# Patient Record
Sex: FEMALE | Race: OTHER | NOT HISPANIC OR LATINO | Employment: FULL TIME | URBAN - METROPOLITAN AREA
[De-identification: names, ages, dates, MRNs, and addresses within clinical notes are randomized per-mention and may not be internally consistent; named-entity substitution may affect disease eponyms.]

---

## 2023-02-27 LAB
ALANINE AMINOTRANSFERASE (SGPT) (U/L) IN SER/PLAS: 29 U/L (ref 7–45)
ALBUMIN (G/DL) IN SER/PLAS: 4.5 G/DL (ref 3.4–5)
ALKALINE PHOSPHATASE (U/L) IN SER/PLAS: 74 U/L (ref 33–136)
ANION GAP IN SER/PLAS: 15 MMOL/L (ref 10–20)
ASPARTATE AMINOTRANSFERASE (SGOT) (U/L) IN SER/PLAS: 12 U/L (ref 9–39)
BILIRUBIN TOTAL (MG/DL) IN SER/PLAS: 0.6 MG/DL (ref 0–1.2)
CALCIUM (MG/DL) IN SER/PLAS: 9.9 MG/DL (ref 8.6–10.6)
CARBON DIOXIDE, TOTAL (MMOL/L) IN SER/PLAS: 26 MMOL/L (ref 21–32)
CHLORIDE (MMOL/L) IN SER/PLAS: 103 MMOL/L (ref 98–107)
CHOLESTEROL IN LDL (MG/DL) IN SER/PLAS BY DIRECT ASSAY: 73 MG/DL (ref 0–129)
CREATININE (MG/DL) IN SER/PLAS: 1.42 MG/DL (ref 0.5–1.05)
ESTIMATED AVERAGE GLUCOSE FOR HBA1C: 180 MG/DL
GFR FEMALE: 41 ML/MIN/1.73M2
GLUCOSE (MG/DL) IN SER/PLAS: 224 MG/DL (ref 74–99)
HEMOGLOBIN A1C/HEMOGLOBIN TOTAL IN BLOOD: 7.9 %
POTASSIUM (MMOL/L) IN SER/PLAS: 4.7 MMOL/L (ref 3.5–5.3)
PROTEIN TOTAL: 7.4 G/DL (ref 6.4–8.2)
SODIUM (MMOL/L) IN SER/PLAS: 139 MMOL/L (ref 136–145)
UREA NITROGEN (MG/DL) IN SER/PLAS: 33 MG/DL (ref 6–23)

## 2023-03-07 ENCOUNTER — DOCUMENTATION (OUTPATIENT)
Dept: CARE COORDINATION | Facility: CLINIC | Age: 63
End: 2023-03-07
Payer: COMMERCIAL

## 2023-03-20 ENCOUNTER — DOCUMENTATION (OUTPATIENT)
Dept: PRIMARY CARE | Facility: CLINIC | Age: 63
End: 2023-03-20
Payer: COMMERCIAL

## 2023-03-20 ENCOUNTER — TELEPHONE (OUTPATIENT)
Dept: PRIMARY CARE | Facility: CLINIC | Age: 63
End: 2023-03-20
Payer: COMMERCIAL

## 2023-03-20 ENCOUNTER — PATIENT OUTREACH (OUTPATIENT)
Dept: CARE COORDINATION | Facility: CLINIC | Age: 63
End: 2023-03-20
Payer: COMMERCIAL

## 2023-03-20 DIAGNOSIS — E11.69 DIABETES MELLITUS TYPE 2 IN OBESE: Primary | ICD-10-CM

## 2023-03-20 DIAGNOSIS — E66.9 DIABETES MELLITUS TYPE 2 IN OBESE: Primary | ICD-10-CM

## 2023-03-20 RX ORDER — SEMAGLUTIDE 2.68 MG/ML
2 INJECTION, SOLUTION SUBCUTANEOUS
Qty: 4 ML | Refills: 5 | Status: SHIPPED | OUTPATIENT
Start: 2023-03-20 | End: 2023-05-18 | Stop reason: ALTCHOICE

## 2023-03-20 NOTE — PROGRESS NOTES
Milestones Visit 4 held via Zoom: Katie has been working hard and has made multiple lifestyle changes for the management of diabetes. Her sensor fell off, but last data from March 7 showed TIR 77% . Went over ways to ensure sensor stays on and sent brochure to email and also encouraged to call for potential free replacement. She is having an issue with getting her Ozempic 2.0 and this was discussed as a team today as the order is in chart for renewal and it is not out of stock per patient. Milestones NP will call the pharmacy today to resolve issue. We let her know she can take missed dose (past Sat.) up to 5 days after or skip it and take next scheduled dose. She has made changes to her breakfast and is having 2 cheese sticks with berries and stopped weekend coffee she was having. She feels better and we will review data once she puts on the new sensor. Today's visit focused on PA and she has increased this by walking 15 minutes a day. Her goal is to increase walk time by 15 min a day and do yoga Mondays and Fridays after work.  She had questions about mood swings if related to diabetes medications.  We inquired about diabetes distress which this does not seem to be related to. She will monitor and discuss with primary care. We will see her next month for visit 5.

## 2023-03-20 NOTE — PROGRESS NOTES
Walt Chery is a 62 y.o. female who presents for  visit 4 of the Diabetes Milestones program today, conducted visit with Lesley Son RD/DANIELA.  Known complications due to diabetes included none    Current symptoms/problems include none. Her clinical course has improved.     Current diabetes regimen is as follows:  Ozempic  Glipizide Jardiance    The patient is currently checking the blood glucose  throughout the day.    Patient is using: continuous glucose monitor and AGP is noted for this visit noting the following values over last 14 days  TIR 77 high 21 very high 2    Hypoglycemia frequency: none  Hypoglycemia awareness: No     Exercise:  she has no routine exercise regimen, she walks to bus and to workplace from bus stop. Enjoys walking, in discussing creating exercise routine she notes that she could walk 15 minutes after lunch in addition to current routine, she also notes that she can and will begin to incorporate yoga on Mondays and Fridays    Meal panning: She is using ADA exchanges and carbohydrate counting.    Review of Systems    Objective   There were no vitals taken for this visit.  Physical Exam    Lab Review  Lab Results   Component Value Date    TRIG 189 (H) 11/04/2022    CHOL 271 (H) 11/04/2022    HDL 50.8 11/04/2022     Lab Results   Component Value Date    HGBA1C 7.9 (A) 02/27/2023    HGBA1C 10.8 (A) 11/04/2022    HGBA1C 7.3 (A) 02/22/2022     Lab Results   Component Value Date    CREATININE 1.42 (H) 02/27/2023       Health Maintenance:   BP Readings from Last 3 Encounters:   12/29/22 116/80   11/16/22 (!) 132/91   10/27/22 (!) 167/102      There is no height or weight on file to calculate BMI.     No current outpatient medications on file.  Assessment/Plan   There are no diagnoses linked to this encounter.    Type 2 diabetes mellitus, is not at goal. Patient's last hemoglobin a1c shows improvement since 7/2022 value.  Lab Results   Component Value Date    HGBA1C 7.9 (A)  02/27/2023     Patient notes she went to [facility] obtain new dose of Ozempic prescribed by her endocrinologist Dr. Strauss and advised it was not there and there was no order    RX changes:  no prescription changes, I will follow up and call about the Ozempic prescription to resolve the issue of her obtaining the 2.0 mg weekly dose    Education:  exercise and discussed how to develop sustainable exercise plan and benefits of physical activity with diabetes. Goals were identified and set as noted in note above  Follow up: I recommend diabetes care be  3 weeks, Milestones visit 5 .

## 2023-03-21 ENCOUNTER — TELEPHONE (OUTPATIENT)
Dept: CARE COORDINATION | Facility: CLINIC | Age: 63
End: 2023-03-21
Payer: COMMERCIAL

## 2023-03-21 NOTE — PROGRESS NOTES
Specialty pharmacy called to reverse order for patient to  Ozmepic medication at [facility], order for Ozempic has already been entered

## 2023-03-31 DIAGNOSIS — E11.9 TYPE 2 DIABETES MELLITUS WITHOUT COMPLICATION, WITHOUT LONG-TERM CURRENT USE OF INSULIN (MULTI): Primary | ICD-10-CM

## 2023-04-07 ENCOUNTER — TELEMEDICINE (OUTPATIENT)
Dept: PHARMACY | Facility: HOSPITAL | Age: 63
End: 2023-04-07
Payer: COMMERCIAL

## 2023-04-07 DIAGNOSIS — E11.9 TYPE 2 DIABETES MELLITUS WITHOUT COMPLICATION, WITHOUT LONG-TERM CURRENT USE OF INSULIN (MULTI): ICD-10-CM

## 2023-04-07 RX ORDER — GLIPIZIDE 2.5 MG/1
2.5 TABLET, EXTENDED RELEASE ORAL DAILY
COMMUNITY
Start: 2022-11-16 | End: 2023-10-06 | Stop reason: ALTCHOICE

## 2023-04-07 RX ORDER — EMPAGLIFLOZIN 25 MG/1
25 TABLET, FILM COATED ORAL DAILY
COMMUNITY
Start: 2022-02-11

## 2023-04-07 RX ORDER — HYDROGEN PEROXIDE 3 %
20 SOLUTION, NON-ORAL MISCELLANEOUS
COMMUNITY

## 2023-04-07 RX ORDER — ATORVASTATIN CALCIUM 80 MG/1
80 TABLET, FILM COATED ORAL DAILY
COMMUNITY
Start: 2023-03-28

## 2023-04-07 RX ORDER — BISOPROLOL FUMARATE AND HYDROCHLOROTHIAZIDE 2.5; 6.25 MG/1; MG/1
1 TABLET ORAL DAILY
COMMUNITY
Start: 2022-05-27

## 2023-04-07 RX ORDER — UBROGEPANT 100 MG/1
100 TABLET ORAL ONCE AS NEEDED
COMMUNITY
Start: 2023-03-10

## 2023-04-07 RX ORDER — EZETIMIBE 10 MG/1
10 TABLET ORAL NIGHTLY
COMMUNITY
Start: 2022-11-21 | End: 2023-10-06 | Stop reason: SDUPTHER

## 2023-04-07 RX ORDER — LISINOPRIL 10 MG/1
10 TABLET ORAL DAILY
COMMUNITY
Start: 2020-03-31 | End: 2023-10-06 | Stop reason: SDUPTHER

## 2023-04-07 RX ORDER — BLOOD-GLUCOSE SENSOR
EACH MISCELLANEOUS
COMMUNITY
Start: 2023-03-03

## 2023-04-07 ASSESSMENT — ENCOUNTER SYMPTOMS: DIABETIC ASSOCIATED SYMPTOMS: 0

## 2023-04-07 NOTE — PROGRESS NOTES
Pharmacy Clinic Annual VBID Consult     Katie Chery is a 62 y.o. female was referred to Clinical Pharmacy Team to complete a comprehensive medication review (CMR) with a pharmacist as part of the [employer] diabetes program.  The patient was referred for their Diabetes.    Referring Provider: Carine Lopez DO    Subjective   Allergies   Allergen Reactions    Adhesive Itching     PAPER TAPE OK    Metformin Diarrhea       [place], [place] - [address]  [address]  [address]  [facility] [address]  Phone: [phone] Fax: [phone]      Diabetes  She presents for her follow-up diabetic visit. She has type 2 diabetes mellitus. The initial diagnosis of diabetes was made 3 years ago. There are no hypoglycemic associated symptoms. There are no diabetic associated symptoms. There are no hypoglycemic complications. Symptoms are stable. There are no diabetic complications. Risk factors for coronary artery disease include diabetes mellitus, dyslipidemia, hypertension and obesity. She is compliant with treatment all of the time. Her overall blood glucose range is 130-140 mg/dl. An ACE inhibitor/angiotensin II receptor blocker is being taken.     Patient reports that she was previously told to take glipizide 3 tablets daily however decreased back to 1 tablet twice daily because she was experiencing some episodes of hypoglycemia. Since switching back to 1 tablet twice daily she has rarely experienced hypoglycemia.     Objective     LAB  Lab Results   Component Value Date    BILITOT 0.6 02/27/2023    CALCIUM 9.9 02/27/2023    CO2 26 02/27/2023     02/27/2023    CREATININE 1.42 (H) 02/27/2023    GLUCOSE 224 (H) 02/27/2023    ALKPHOS 74 02/27/2023    K 4.7 02/27/2023    PROT 7.4 02/27/2023     02/27/2023    AST 12 02/27/2023    ALT 29 02/27/2023    BUN 33 (H) 02/27/2023    ANIONGAP 15 02/27/2023    MG 1.30 (L) 07/10/2021    PHOS 3.1 07/10/2021    ALBUMIN  4.5 02/27/2023    LIPASE 39 07/09/2021     Lab Results   Component Value Date    CHOL 271 (H) 11/04/2022    CHOL 140 07/09/2021    CHOL 141 05/06/2020     Lab Results   Component Value Date    HDL 50.8 11/04/2022    HDL 35.0 (A) 07/09/2021    HDL 52.0 05/06/2020     No results found for: LDLCALC  Lab Results   Component Value Date    TRIG 189 (H) 11/04/2022    TRIG 178 (H) 07/09/2021    TRIG 104 05/06/2020     No components found for: CHOLHDL   Lab Results   Component Value Date    HGBA1C 7.9 (A) 02/27/2023    HGBA1C 10.8 (A) 11/04/2022    HGBA1C 7.3 (A) 02/22/2022     The ASCVD Risk score (Zhanna WATERMAN, et al., 2019) failed to calculate for the following reasons:    The smoking status is invalid    Current Outpatient Medications on File Prior to Visit   Medication Sig Dispense Refill    atorvastatin (Lipitor) 80 mg tablet Take 1 tablet (80 mg) by mouth once daily.      bisoproloL-hydrochlorothiazide (Ziac) 2.5-6.25 mg tablet Take 1 tablet by mouth once daily.      ezetimibe (Zetia) 10 mg tablet Take 1 tablet (10 mg) by mouth once daily at bedtime.      FreeStyle Coni 3 Sensor device Change sensor every 14 days as directed      glipiZIDE XL (Glucotrol XL) 2.5 mg 24 hr tablet Take 1 tablet (2.5 mg) by mouth in the morning and 1 tablet (2.5 mg) before bedtime.      Jardiance 25 mg Take 1 tablet (25 mg) by mouth once daily.      lisinopril 10 mg tablet Take 1 tablet (10 mg) by mouth once daily.      Ubrelvy 100 mg tablet tablet Take 1 tablet (100 mg) by mouth 1 time if needed (mirgaine). At onset of migraine      semaglutide (Ozempic) 2 mg/dose (8 mg/3 mL) pen injector Inject 2 mg under the skin every 7 days. 4 mL 5     No current facility-administered medications on file prior to visit.      HISTORICAL PHARMACOTHERAPY  Metformin (unable to tolerate due to vomiting and diarrhea)  Glimepiride   Glyburide   Victoza (switched to Ozempic)    DRUG INTERACTIONS  No drug-drug interactions exist that require change in therapy      SECONDARY PREVENTION  - Statin? yes - Atorvastatin 80 mg   - ACE-I/ARB? yes - Lisinopril 10 mg     Assessment/Plan   Problem List Items Addressed This Visit    None  Visit Diagnoses       Type 2 diabetes mellitus without complication, without long-term current use of insulin (CMS/[facility])               CONTINUE:   Glipizide ER 2.5 mg 1 tablet twice daily   Jardiance 25 mg once daily   Ozempic 1 mg once weekly   Patient information recorded and transmitted to [employer] for VBID override for diabetes medication copay reduction. Patient's insurance will be updated for the additional benefit of $0 copays in 7-10 business days.    Follow-up: 1 year    Aj Sesay, PharmD   [facility] Hospital[facility]eds PGY-1 Pharmacy Resident

## 2023-04-10 ENCOUNTER — PATIENT OUTREACH (OUTPATIENT)
Dept: CARE COORDINATION | Facility: CLINIC | Age: 63
End: 2023-04-10
Payer: COMMERCIAL

## 2023-04-11 NOTE — PROGRESS NOTES
I reviewed the progress note and agree with the resident’s findings and plans as written. Case discussed with resident.    Eloy Garcia, PharmD

## 2023-04-20 ENCOUNTER — PATIENT OUTREACH (OUTPATIENT)
Dept: CARE COORDINATION | Facility: CLINIC | Age: 63
End: 2023-04-20
Payer: COMMERCIAL

## 2023-04-20 ENCOUNTER — DOCUMENTATION (OUTPATIENT)
Dept: PRIMARY CARE | Facility: CLINIC | Age: 63
End: 2023-04-20
Payer: COMMERCIAL

## 2023-04-20 DIAGNOSIS — E11.69 DIABETES MELLITUS TYPE 2 IN OBESE: Primary | ICD-10-CM

## 2023-04-20 DIAGNOSIS — E66.9 DIABETES MELLITUS TYPE 2 IN OBESE: Primary | ICD-10-CM

## 2023-04-20 RX ORDER — TIRZEPATIDE 7.5 MG/.5ML
7.5 INJECTION, SOLUTION SUBCUTANEOUS
Qty: 2 ML | Refills: 3 | Status: SHIPPED | OUTPATIENT
Start: 2023-04-20 | End: 2023-04-20

## 2023-04-20 NOTE — CARE PLAN
Problem: A1C> 8.5% Will participate in Diabetes Milestones Program  Goal: Patient will effectively self-manage their DM disease process  Outcome: Progressing  Milestones team met with Katie for visit 5 today via Zoom: She is doing well and notes that sugars were running higher due to a cold she had recently. We explained that is expected when we are sick. She has made the changes we discussed with her diet and is walking more. Today we reviewed her care plan and Milestones goals. She has met them all and will continue to work on weight loss, walking and fitness and eating/planning healthy meals. She participates in  [employer] program and finds that is a good support for the education she received in [employer] on nutrition. NP wrote change to Ozempic 2.0 for Mounjaro 7.5 once she finishes her Ozempic pens in one month. This is to further assist with wt loss (so far has lost 6 pounds) and to help increase TIR (current is 66%). We will meet with her as part of post-program visits going forward.

## 2023-04-20 NOTE — PROGRESS NOTES
Subjective   Katie Chery is a 62 y.o. female who presents for vist 5 diabetes Milestones program.  Updates:  Has loss weight and is now 188 where she was in the upper 190's in the past  She has continued to take the Ozempic with no side effects  Using the sensor   Exercising now a few times a week       Known complications due to diabetes included cardiovascular disease and obesity    Current symptoms/problems include hyperglycemia. Her clinical course has improved.     Current diabetes regimen is as follows:  GLP1, Jardiance, glipizide      The patient is currently checking the blood glucose all times per day.    Patient is using: continuous glucose monitor and reviewed in visit where data over last 14 days noted the following: TIR 66, high 31, very high 3    Hypoglycemia frequency: none  Hypoglycemia awareness: Yes     Exercise: three times a week   Meal panning: She is using avoidance of concentrated sweets and ADA exchanges.    Review of Systems    Objective   There were no vitals taken for this visit.  Physical Exam    Lab Review  Lab Results   Component Value Date    TRIG 189 (H) 11/04/2022    CHOL 271 (H) 11/04/2022    HDL 50.8 11/04/2022     Lab Results   Component Value Date    HGBA1C 7.9 (A) 02/27/2023    HGBA1C 10.8 (A) 11/04/2022    HGBA1C 7.3 (A) 02/22/2022     Lab Results   Component Value Date    CREATININE 1.42 (H) 02/27/2023       Health Maintenance:   BP Readings from Last 3 Encounters:   12/29/22 116/80   11/16/22 (!) 132/91   10/27/22 (!) 167/102      There is no height or weight on file to calculate BMI.       Current Outpatient Medications:     atorvastatin (Lipitor) 80 mg tablet, Take 1 tablet (80 mg) by mouth once daily., Disp: , Rfl:     bisoproloL-hydrochlorothiazide (Ziac) 2.5-6.25 mg tablet, Take 1 tablet by mouth once daily., Disp: , Rfl:     esomeprazole (NexIUM) 20 mg DR capsule, Take 1 capsule (20 mg) by mouth once daily in the morning. Take before meals. Do not open capsule.,  Disp: , Rfl:     ezetimibe (Zetia) 10 mg tablet, Take 1 tablet (10 mg) by mouth once daily at bedtime., Disp: , Rfl:     FreeStyle Coni 3 Sensor device, Change sensor every 14 days as directed, Disp: , Rfl:     glipiZIDE XL (Glucotrol XL) 2.5 mg 24 hr tablet, Take 1 tablet (2.5 mg) by mouth in the morning and 1 tablet (2.5 mg) before bedtime., Disp: , Rfl:     Jardiance 25 mg, Take 1 tablet (25 mg) by mouth once daily., Disp: , Rfl:     lisinopril 10 mg tablet, Take 1 tablet (10 mg) by mouth once daily., Disp: , Rfl:     rimegepant (NURTEC) 75 mg tablet,disintegrating, Take 1 tablet (75 mg) by mouth 1 time if needed (headaches)., Disp: , Rfl:     semaglutide (Ozempic) 2 mg/dose (8 mg/3 mL) pen injector, Inject 2 mg under the skin every 7 days., Disp: 4 mL, Rfl: 5    tirzepatide (Mounjaro) 7.5 mg/0.5 mL pen injector, Inject 7.5 mg under the skin 1 (one) time per week., Disp: 2 mL, Rfl: 3    Ubrelvy 100 mg tablet tablet, Take 1 tablet (100 mg) by mouth 1 time if needed (mirgaine). At onset of migraine, Disp: , Rfl:   Assessment/Plan   Diagnoses and all orders for this visit:  Diabetes mellitus type 2 in obese (CMS/[facility])  -     tirzepatide (Mounjaro) 7.5 mg/0.5 mL pen injector; Inject 7.5 mg under the skin 1 (one) time per week.  -     Hemoglobin A1C; Future  -     Comprehensive Metabolic Panel; Future  -     Lipid Panel; Future      Type 2 diabetes mellitus, is not at goal. Very close to goal, switching to Mounjaro for further weight loss    RX changes:  Mounjaro 7.5 mg weekly     Education:  blood sugar goals, exercise, and nutrition  Follow up: I recommend diabetes care be 1 month.  Discussed and shown reputable psychosocial support network and medical guideline sites, discussed diabetes annual prevention methods, discussed prior care plan and noted areas of goals achieved and development of new focus areas with monthly touch point visits, foot care reviewed

## 2023-05-18 ENCOUNTER — DOCUMENTATION (OUTPATIENT)
Dept: PRIMARY CARE | Facility: CLINIC | Age: 63
End: 2023-05-18
Payer: COMMERCIAL

## 2023-05-18 ENCOUNTER — PATIENT OUTREACH (OUTPATIENT)
Dept: CARE COORDINATION | Facility: CLINIC | Age: 63
End: 2023-05-18
Payer: COMMERCIAL

## 2023-05-18 RX ORDER — DULOXETIN HYDROCHLORIDE 60 MG/1
60 CAPSULE, DELAYED RELEASE ORAL DAILY
COMMUNITY
Start: 2023-04-18 | End: 2023-10-06 | Stop reason: SDUPTHER

## 2023-05-18 ASSESSMENT — ENCOUNTER SYMPTOMS
DIARRHEA: 0
PHOTOPHOBIA: 0
NUMBNESS: 0
BACK PAIN: 0
SPEECH DIFFICULTY: 0
CONSTIPATION: 0
EYE DISCHARGE: 0
POLYDIPSIA: 0
POLYPHAGIA: 0
RHINORRHEA: 0
ABDOMINAL DISTENTION: 0
ACTIVITY CHANGE: 0
FLANK PAIN: 0
HEADACHES: 0
DIZZINESS: 0
FATIGUE: 0
COUGH: 0
FREQUENCY: 0
EYE PAIN: 0

## 2023-05-18 NOTE — PROGRESS NOTES
Subjective   Katie Chery is a 62 y.o. female who presents for follow-up of Type 2 diabetes mellitus and the Diabetes Milestones program.     Updates: Has begun prepping meals, went tot PCP appointment and noted to have loss 10 pounds. She did not have her glipizide for a few days and her glucose numbers were higher and noted once it was restarted the glucose values returned to baseline.  She took her last Ozempic pen this past week and will begin Mounjaro this week at the 7.5 mg weekly dose.    Known complications due to diabetes included none    Current symptoms/problems include none. Her clinical course has been stable.     Current diabetes regimen is as follows:  GLP1,   Jardiance, glipizide    The patient is currently checking the blood glucose all times per day.    Patient is using: continuous glucose monitor and TIR overlast 14 days 45 high 47 very high 8    Hypoglycemia frequency: none  Hypoglycemia awareness: Yes     Exercise: intermittently   Meal panning: She is using carbohydrate counting.    Review of Systems   Constitutional:  Negative for activity change and fatigue.   HENT:  Negative for congestion, dental problem, ear pain, mouth sores and rhinorrhea.    Eyes:  Negative for photophobia, pain and discharge.   Respiratory:  Negative for cough.    Cardiovascular:  Negative for leg swelling.   Gastrointestinal:  Negative for abdominal distention, constipation and diarrhea.   Endocrine: Negative for polydipsia, polyphagia and polyuria.   Genitourinary:  Negative for flank pain, frequency and urgency.   Musculoskeletal:  Negative for back pain.   Skin:  Negative for pallor and rash.   Neurological:  Negative for dizziness, speech difficulty, numbness and headaches.   Psychiatric/Behavioral:  Negative for behavioral problems.        Objective   There were no vitals taken for this visit.  Physical Exam    Lab Review  Lab Results   Component Value Date    TRIG 189 (H) 11/04/2022    CHOL 271 (H) 11/04/2022     HDL 50.8 11/04/2022     Lab Results   Component Value Date    HGBA1C 7.9 (A) 02/27/2023    HGBA1C 10.8 (A) 11/04/2022    HGBA1C 7.3 (A) 02/22/2022     Lab Results   Component Value Date    CREATININE 1.42 (H) 02/27/2023       Health Maintenance:   BP Readings from Last 3 Encounters:   02/24/23 118/77   01/04/23 117/82   12/29/22 116/80      There is no height or weight on file to calculate BMI.       Current Outpatient Medications:     atorvastatin (Lipitor) 80 mg tablet, Take 1 tablet (80 mg) by mouth once daily., Disp: , Rfl:     bisoproloL-hydrochlorothiazide (Ziac) 2.5-6.25 mg tablet, Take 1 tablet by mouth once daily., Disp: , Rfl:     esomeprazole (NexIUM) 20 mg DR capsule, Take 1 capsule (20 mg) by mouth once daily in the morning. Take before meals. Do not open capsule., Disp: , Rfl:     ezetimibe (Zetia) 10 mg tablet, Take 1 tablet (10 mg) by mouth once daily at bedtime., Disp: , Rfl:     FreeStyle Coni 3 Sensor device, Change sensor every 14 days as directed, Disp: , Rfl:     glipiZIDE XL (Glucotrol XL) 2.5 mg 24 hr tablet, Take 1 tablet (2.5 mg) by mouth in the morning and 1 tablet (2.5 mg) before bedtime., Disp: , Rfl:     Jardiance 25 mg, Take 1 tablet (25 mg) by mouth once daily., Disp: , Rfl:     lisinopril 10 mg tablet, Take 1 tablet (10 mg) by mouth once daily., Disp: , Rfl:     rimegepant (NURTEC) 75 mg tablet,disintegrating, Take 1 tablet (75 mg) by mouth 1 time if needed (headaches)., Disp: , Rfl:     semaglutide (Ozempic) 2 mg/dose (8 mg/3 mL) pen injector, Inject 2 mg under the skin every 7 days., Disp: 4 mL, Rfl: 5    tirzepatide (Mounjaro) 7.5 mg/0.5 mL pen injector, Inject 7.5 mg under the skin 1 (one) time per week., Disp: 2 mL, Rfl: 3    Ubrelvy 100 mg tablet tablet, Take 1 tablet (100 mg) by mouth 1 time if needed (mirgaine). At onset of migraine, Disp: , Rfl:   Assessment/Plan   There are no diagnoses linked to this encounter.    Type 2 diabetes mellitus, is at goal.   TIR was off as  she did not have glipizide, advised that if there are low alarms when she begins the Mounjaro to stop the medication as it can cause low glucose in combination with other diabetes medications.  She will obtain  labs prior to the next consult  RX changes: none   Education:  blood sugar goals, nutrition, carbohydrate counting, and medications  Follow up: I recommend diabetes care be 1 month.

## 2023-06-02 ENCOUNTER — LAB (OUTPATIENT)
Dept: LAB | Facility: LAB | Age: 63
End: 2023-06-02
Payer: COMMERCIAL

## 2023-06-02 DIAGNOSIS — E11.69 DIABETES MELLITUS TYPE 2 IN OBESE: ICD-10-CM

## 2023-06-02 DIAGNOSIS — E66.9 DIABETES MELLITUS TYPE 2 IN OBESE: ICD-10-CM

## 2023-06-02 LAB
ALANINE AMINOTRANSFERASE (SGPT) (U/L) IN SER/PLAS: 29 U/L (ref 7–45)
ALBUMIN (G/DL) IN SER/PLAS: 4.2 G/DL (ref 3.4–5)
ALBUMIN (G/DL) IN SER/PLAS: 4.2 G/DL (ref 3.4–5)
ALKALINE PHOSPHATASE (U/L) IN SER/PLAS: 69 U/L (ref 33–136)
ANION GAP IN SER/PLAS: 16 MMOL/L (ref 10–20)
ANION GAP IN SER/PLAS: 16 MMOL/L (ref 10–20)
ASPARTATE AMINOTRANSFERASE (SGOT) (U/L) IN SER/PLAS: 13 U/L (ref 9–39)
BASOPHILS (10*3/UL) IN BLOOD BY AUTOMATED COUNT: 0.05 X10E9/L (ref 0–0.1)
BASOPHILS/100 LEUKOCYTES IN BLOOD BY AUTOMATED COUNT: 0.6 % (ref 0–2)
BILIRUBIN TOTAL (MG/DL) IN SER/PLAS: 0.7 MG/DL (ref 0–1.2)
CALCIUM (MG/DL) IN SER/PLAS: 10 MG/DL (ref 8.6–10.6)
CALCIUM (MG/DL) IN SER/PLAS: 9.9 MG/DL (ref 8.6–10.6)
CARBON DIOXIDE, TOTAL (MMOL/L) IN SER/PLAS: 26 MMOL/L (ref 21–32)
CARBON DIOXIDE, TOTAL (MMOL/L) IN SER/PLAS: 27 MMOL/L (ref 21–32)
CHLORIDE (MMOL/L) IN SER/PLAS: 101 MMOL/L (ref 98–107)
CHLORIDE (MMOL/L) IN SER/PLAS: 102 MMOL/L (ref 98–107)
CHOLESTEROL (MG/DL) IN SER/PLAS: 127 MG/DL (ref 0–199)
CHOLESTEROL IN HDL (MG/DL) IN SER/PLAS: 43.5 MG/DL
CHOLESTEROL/HDL RATIO: 2.9
CREATININE (MG/DL) IN SER/PLAS: 1.33 MG/DL (ref 0.5–1.05)
CREATININE (MG/DL) IN SER/PLAS: 1.34 MG/DL (ref 0.5–1.05)
EOSINOPHILS (10*3/UL) IN BLOOD BY AUTOMATED COUNT: 0.21 X10E9/L (ref 0–0.7)
EOSINOPHILS/100 LEUKOCYTES IN BLOOD BY AUTOMATED COUNT: 2.5 % (ref 0–6)
ERYTHROCYTE DISTRIBUTION WIDTH (RATIO) BY AUTOMATED COUNT: 13.5 % (ref 11.5–14.5)
ERYTHROCYTE MEAN CORPUSCULAR HEMOGLOBIN CONCENTRATION (G/DL) BY AUTOMATED: 31.9 G/DL (ref 32–36)
ERYTHROCYTE MEAN CORPUSCULAR VOLUME (FL) BY AUTOMATED COUNT: 91 FL (ref 80–100)
ERYTHROCYTES (10*6/UL) IN BLOOD BY AUTOMATED COUNT: 5.2 X10E12/L (ref 4–5.2)
ESTIMATED AVERAGE GLUCOSE FOR HBA1C: 186 MG/DL
ESTIMATED AVERAGE GLUCOSE FOR HBA1C: 186 MG/DL
GFR FEMALE: 44 ML/MIN/1.73M2
GFR FEMALE: 45 ML/MIN/1.73M2
GLUCOSE (MG/DL) IN SER/PLAS: 176 MG/DL (ref 74–99)
GLUCOSE (MG/DL) IN SER/PLAS: 183 MG/DL (ref 74–99)
HEMATOCRIT (%) IN BLOOD BY AUTOMATED COUNT: 47.4 % (ref 36–46)
HEMOGLOBIN (G/DL) IN BLOOD: 15.1 G/DL (ref 12–16)
HEMOGLOBIN A1C/HEMOGLOBIN TOTAL IN BLOOD: 8.1 %
HEMOGLOBIN A1C/HEMOGLOBIN TOTAL IN BLOOD: 8.1 %
IMMATURE GRANULOCYTES/100 LEUKOCYTES IN BLOOD BY AUTOMATED COUNT: 0.2 % (ref 0–0.9)
LDL: 50 MG/DL (ref 0–99)
LEUKOCYTES (10*3/UL) IN BLOOD BY AUTOMATED COUNT: 8.6 X10E9/L (ref 4.4–11.3)
LYMPHOCYTES (10*3/UL) IN BLOOD BY AUTOMATED COUNT: 2.54 X10E9/L (ref 1.2–4.8)
LYMPHOCYTES/100 LEUKOCYTES IN BLOOD BY AUTOMATED COUNT: 29.7 % (ref 13–44)
MONOCYTES (10*3/UL) IN BLOOD BY AUTOMATED COUNT: 0.75 X10E9/L (ref 0.1–1)
MONOCYTES/100 LEUKOCYTES IN BLOOD BY AUTOMATED COUNT: 8.8 % (ref 2–10)
NEUTROPHILS (10*3/UL) IN BLOOD BY AUTOMATED COUNT: 4.98 X10E9/L (ref 1.2–7.7)
NEUTROPHILS/100 LEUKOCYTES IN BLOOD BY AUTOMATED COUNT: 58.2 % (ref 40–80)
NRBC (PER 100 WBCS) BY AUTOMATED COUNT: 0 /100 WBC (ref 0–0)
PHOSPHATE (MG/DL) IN SER/PLAS: 4.4 MG/DL (ref 2.5–4.9)
PLATELETS (10*3/UL) IN BLOOD AUTOMATED COUNT: 276 X10E9/L (ref 150–450)
POTASSIUM (MMOL/L) IN SER/PLAS: 4.9 MMOL/L (ref 3.5–5.3)
POTASSIUM (MMOL/L) IN SER/PLAS: 5.1 MMOL/L (ref 3.5–5.3)
PROTEIN TOTAL: 7.5 G/DL (ref 6.4–8.2)
SODIUM (MMOL/L) IN SER/PLAS: 139 MMOL/L (ref 136–145)
SODIUM (MMOL/L) IN SER/PLAS: 139 MMOL/L (ref 136–145)
THYROTROPIN (MIU/L) IN SER/PLAS BY DETECTION LIMIT <= 0.05 MIU/L: 3.65 MIU/L (ref 0.44–3.98)
TRIGLYCERIDE (MG/DL) IN SER/PLAS: 167 MG/DL (ref 0–149)
UREA NITROGEN (MG/DL) IN SER/PLAS: 42 MG/DL (ref 6–23)
UREA NITROGEN (MG/DL) IN SER/PLAS: 43 MG/DL (ref 6–23)
VLDL: 33 MG/DL (ref 0–40)

## 2023-06-02 PROCEDURE — 83036 HEMOGLOBIN GLYCOSYLATED A1C: CPT

## 2023-06-02 PROCEDURE — 80053 COMPREHEN METABOLIC PANEL: CPT

## 2023-06-02 PROCEDURE — 80061 LIPID PANEL: CPT

## 2023-06-02 PROCEDURE — 36415 COLL VENOUS BLD VENIPUNCTURE: CPT

## 2023-06-13 ENCOUNTER — DOCUMENTATION (OUTPATIENT)
Dept: PRIMARY CARE | Facility: CLINIC | Age: 63
End: 2023-06-13
Payer: COMMERCIAL

## 2023-06-13 ENCOUNTER — PATIENT OUTREACH (OUTPATIENT)
Dept: CARE COORDINATION | Facility: CLINIC | Age: 63
End: 2023-06-13
Payer: COMMERCIAL

## 2023-06-13 NOTE — CARE PLAN
Katie was not on our scheduled Zoom Milestones call today. Called and LVM for her to join us if she can (resent link to call) or to send us some dates for rescheduling.

## 2023-06-13 NOTE — PROGRESS NOTES
Patient scheduled for Diabetes Milestones visit today, invite for meeting resent and patient called with voicemail message left for her to return the call.

## 2023-06-22 ENCOUNTER — TELEPHONE (OUTPATIENT)
Dept: CARE COORDINATION | Facility: CLINIC | Age: 63
End: 2023-06-22
Payer: COMMERCIAL

## 2023-06-22 NOTE — PROGRESS NOTES
Outpatient Evaluation on 2023      Patient: Katie Chery                     : 1960  [id number]        Yaw Armstrong MD PhD   [phone]  [email or web address]      Lab Results   Component Value Date    HGBA1C 8.1 (A) 2023       Patient ID: Katie Chery  is a 62 y.o.    female      Reason for Visit/Phone Call  Reschedule missed  Zoom call    Plan of Action/Results:  Left msg on automated machine pick-up to call me to schedule her 30-min. Next Milestones mtg.

## 2023-08-02 ENCOUNTER — TELEPHONE (OUTPATIENT)
Dept: CARE COORDINATION | Facility: CLINIC | Age: 63
End: 2023-08-02
Payer: COMMERCIAL

## 2023-08-30 PROBLEM — G44.52 NEW DAILY PERSISTENT HEADACHE: Status: ACTIVE | Noted: 2023-08-30

## 2023-08-30 PROBLEM — E78.5 HYPERLIPIDEMIA: Status: ACTIVE | Noted: 2023-08-30

## 2023-08-30 PROBLEM — E11.9 TYPE 2 DIABETES MELLITUS (MULTI): Status: ACTIVE | Noted: 2023-08-30

## 2023-08-30 PROBLEM — E11.40 DIABETIC NEUROPATHY (MULTI): Status: ACTIVE | Noted: 2023-08-30

## 2023-08-30 PROBLEM — R07.9 CHEST PAIN: Status: ACTIVE | Noted: 2023-08-30

## 2023-08-30 PROBLEM — E66.9 OBESITY (BMI 30-39.9): Status: ACTIVE | Noted: 2023-08-30

## 2023-08-30 PROBLEM — K21.9 GASTROESOPHAGEAL REFLUX DISEASE WITHOUT ESOPHAGITIS: Status: ACTIVE | Noted: 2023-08-30

## 2023-08-30 PROBLEM — G43.719 INTRACTABLE CHRONIC MIGRAINE WITHOUT AURA AND WITHOUT STATUS MIGRAINOSUS: Status: ACTIVE | Noted: 2023-08-30

## 2023-08-30 PROBLEM — R55 SYNCOPE: Status: ACTIVE | Noted: 2023-08-30

## 2023-08-30 PROBLEM — R94.39 ABNORMAL STRESS TEST: Status: ACTIVE | Noted: 2023-08-30

## 2023-08-30 PROBLEM — I10 BENIGN HYPERTENSION: Status: ACTIVE | Noted: 2023-08-30

## 2023-08-30 PROBLEM — M25.511 CHRONIC RIGHT SHOULDER PAIN: Status: ACTIVE | Noted: 2023-08-30

## 2023-08-30 PROBLEM — G89.29 CHRONIC RIGHT SHOULDER PAIN: Status: ACTIVE | Noted: 2023-08-30

## 2023-08-30 RX ORDER — PEN NEEDLE, DIABETIC 30 GX3/16"
NEEDLE, DISPOSABLE MISCELLANEOUS
COMMUNITY

## 2023-08-30 RX ORDER — IBUPROFEN 200 MG
TABLET ORAL
COMMUNITY

## 2023-08-30 RX ORDER — TIRZEPATIDE 2.5 MG/.5ML
5 INJECTION, SOLUTION SUBCUTANEOUS
COMMUNITY
Start: 2023-06-09 | End: 2023-10-06 | Stop reason: ALTCHOICE

## 2023-08-30 RX ORDER — LANCETS 33 GAUGE
EACH MISCELLANEOUS
COMMUNITY
Start: 2022-10-27

## 2023-08-30 RX ORDER — SEMAGLUTIDE 2.68 MG/ML
INJECTION, SOLUTION SUBCUTANEOUS
COMMUNITY
Start: 2023-06-28 | End: 2023-10-06 | Stop reason: ALTCHOICE

## 2023-08-30 RX ORDER — REPAGLINIDE 2 MG/1
TABLET ORAL
COMMUNITY
Start: 2023-06-09

## 2023-08-30 RX ORDER — LANCETS 30 GAUGE
EACH MISCELLANEOUS
COMMUNITY
Start: 2022-10-27

## 2023-08-30 RX ORDER — IBUPROFEN 800 MG/1
800 TABLET ORAL 3 TIMES DAILY PRN
COMMUNITY
End: 2023-10-06 | Stop reason: SDUPTHER

## 2023-08-30 RX ORDER — GLYBURIDE 2.5 MG/1
TABLET ORAL
COMMUNITY
Start: 2022-11-10 | End: 2023-10-06 | Stop reason: ALTCHOICE

## 2023-09-29 ENCOUNTER — DOCUMENTATION (OUTPATIENT)
Dept: PRIMARY CARE | Facility: CLINIC | Age: 63
End: 2023-09-29
Payer: COMMERCIAL

## 2023-09-29 NOTE — PROGRESS NOTES
Patient outreach call regarding diabetes, has not been seen in a number of months, email will be sent as well for consult.

## 2023-10-03 ENCOUNTER — PHARMACY VISIT (OUTPATIENT)
Dept: PHARMACY | Facility: CLINIC | Age: 63
End: 2023-10-03
Payer: COMMERCIAL

## 2023-10-03 PROCEDURE — RXMED WILLOW AMBULATORY MEDICATION CHARGE

## 2023-10-06 ENCOUNTER — PHARMACY VISIT (OUTPATIENT)
Dept: PHARMACY | Facility: CLINIC | Age: 63
End: 2023-10-06
Payer: COMMERCIAL

## 2023-10-06 ENCOUNTER — OFFICE VISIT (OUTPATIENT)
Dept: ENDOCRINOLOGY | Facility: CLINIC | Age: 63
End: 2023-10-06
Payer: COMMERCIAL

## 2023-10-06 VITALS
DIASTOLIC BLOOD PRESSURE: 88 MMHG | WEIGHT: 194 LBS | HEART RATE: 82 BPM | BODY MASS INDEX: 33.12 KG/M2 | SYSTOLIC BLOOD PRESSURE: 132 MMHG | HEIGHT: 64 IN

## 2023-10-06 DIAGNOSIS — E11.69 TYPE 2 DIABETES MELLITUS WITH OTHER SPECIFIED COMPLICATION, WITHOUT LONG-TERM CURRENT USE OF INSULIN (MULTI): ICD-10-CM

## 2023-10-06 LAB
POC FINGERSTICK BLOOD GLUCOSE: 180 MG/DL (ref 70–100)
POC HEMOGLOBIN A1C: 8.3 % (ref 4.2–6.5)

## 2023-10-06 PROCEDURE — 83036 HEMOGLOBIN GLYCOSYLATED A1C: CPT | Performed by: INTERNAL MEDICINE

## 2023-10-06 PROCEDURE — 3075F SYST BP GE 130 - 139MM HG: CPT | Performed by: INTERNAL MEDICINE

## 2023-10-06 PROCEDURE — 3052F HG A1C>EQUAL 8.0%<EQUAL 9.0%: CPT | Performed by: INTERNAL MEDICINE

## 2023-10-06 PROCEDURE — RXMED WILLOW AMBULATORY MEDICATION CHARGE

## 2023-10-06 PROCEDURE — 82962 GLUCOSE BLOOD TEST: CPT | Performed by: INTERNAL MEDICINE

## 2023-10-06 PROCEDURE — 99213 OFFICE O/P EST LOW 20 MIN: CPT | Performed by: INTERNAL MEDICINE

## 2023-10-06 PROCEDURE — 4010F ACE/ARB THERAPY RXD/TAKEN: CPT | Performed by: INTERNAL MEDICINE

## 2023-10-06 PROCEDURE — 3079F DIAST BP 80-89 MM HG: CPT | Performed by: INTERNAL MEDICINE

## 2023-10-06 RX ORDER — GLIPIZIDE 2.5 MG/1
2.5 TABLET, EXTENDED RELEASE ORAL
Qty: 180 TABLET | Refills: 2 | Status: SHIPPED | OUTPATIENT
Start: 2023-10-06 | End: 2024-10-05

## 2023-10-06 RX ORDER — TIRZEPATIDE 7.5 MG/.5ML
7.5 INJECTION, SOLUTION SUBCUTANEOUS
Qty: 2 ML | Refills: 5 | Status: SHIPPED | OUTPATIENT
Start: 2023-10-06

## 2023-10-06 RX ORDER — TIRZEPATIDE 5 MG/.5ML
5 INJECTION, SOLUTION SUBCUTANEOUS
Qty: 2 ML | Refills: 5 | Status: SHIPPED | OUTPATIENT
Start: 2023-10-06

## 2023-10-06 ASSESSMENT — PAIN SCALES - GENERAL: PAINLEVEL: 7

## 2023-10-06 NOTE — PROGRESS NOTES
Patient Discussion/Summary     please continue glipizide ER 2.5 mg twice a day with meals.   moujaro 5 mg weekly,  Jardiance 25 mg daily    please call if blood sugars <70 or >250. [phone]      Provider Impressions     62yo CF with HTN , HLD , type 2 DM uncontrolled is here for f/ollow up  visit:  1- uncontrolled type 2 DM: c peptide not low, TIR 67% , last A1c was 10% recheck albs , adjustments per tootie,see RD see Cardiology for chol and abnormal stress test no acute issues today no hypoglycemia. TIR 67% and high 29% mainly in am , we will increase mounjaro discussed risks of ileus as well ands he agreed , we will also increase mounjaro to 7.5 mg  weekly after  4 weeks, glipi zide and jardiance twicea day , she will call with updates.   2- DM complications screening: eye exam updated, monofilament test 6/6 Bilat and referral to podiatrist      Chief Complaint     Follow up for diabetes.        History of Present [age]ksadip89 yo HTN , HLD, Type 2 DM for 3 years uncontrolled is here for initial visit for diabetes management    checks BG once a day FBG :190- 200. A1c 10%  Bf:oatmeal, cheese , pr drink 6:30 am   Lunch:fruit , pr drink  Dinner: veggie, meat., starch 5-6 pm   bedtime peanut butter   FH :father,  menopause age 54, 3 children, none last child had GDM, no miscarriages.   eye exam: updated , no DR   some neuropathy. by PCP on jardiance 25 mg daily discussed  agreed with risk of gangrene infection , DKA, uti , candidiasis, amputation,   we discussed switching victoza to ozempic agreed with risk of pancreatitis and MTC and worsening of DR  we will check c peptide and JACOBY 65.    no hypoglycemia    here for folow up:    no Lows TIR 67% high 29% verey high 4%   Still has spike of BG in am , she eats grapes and protein drink in am that contributes to hyperglycemia, will increase ozmepic see RD next week to discuss plan for better fruits and snacks and she will call us if this does not work   no side effects of  jardiance no acute issues, seeing cardiology as well LDL was elevated on zetia and statin recheck again seeing cards as well too.      Review of Systems     Constitutional: feeling tired (fatigue), but no fever, no chills, normal appetite, no recent weight gain and no recent weight loss.   Endocrine:.   no hot flashes.    no night sweats.    no intolerance to heat.    no intolerance to cold.   Eyes: no eye pain, no double vision and no change in vision.   ENT: no hearing loss, no dental problems, no sore throat, no change in voice and as noted in HPI.   Cardiovascular: no chest pain, no palpitations, no intermittent leg claudication and no lower extremity edema.   Respiratory: no shortness of breath, no wheezing and no cough.   Gastrointestinal: no abdominal pain, no constipation, no nausea, no diarrhea and no vomiting.   Genitourinary: no dysuria, no incontinence and no urinary hesitancy.   Genitourinary: no dysuria, no incontinence and no urinary hesitancy.   Musculoskeletal: no arthralgias, no myalgias, no muscle cramps, no joint swelling, no joint stiffness and no limb pain.   Integumentary: no change in skin color, no skin lesions, no itching, no excessive sweating and no skin wound.   Neurological: no confusion, no convulsions, no dizziness, no fainting, no tremors, no foot pain, no limb weakness and no difficulty walking.   Psychiatric: no anxiety, no depression, no personality change, no emotional problems, no suicidal ideation, no sleep disturbances and no panic attacks.   Hematologic/Lymphatic: no swollen glands in the neck, no tendency for easy bruising and no tendency for easy bleeding.   All other systems have been reviewed and are negative for complaint.      Active Problems  Problems    · Abnormal stress test (794.39) (R94.39)   · Acute pain of left shoulder (719.41) (M25.512)   · Benign hypertension (401.1) (I10)   · Borderline hypertension (796.2) (R03.0)   · Chest pain (786.50) (R07.9)   · Chest  tightness (786.59) (R07.89)   · Colon cancer screening (V76.51) (Z12.11)   · Encounter for immunization (V03.89) (Z23)   · Gastroesophageal reflux disease without esophagitis (530.81) (K21.9)   · Headache in front of head (784.0) (R51.9)   · Hyperlipidemia (272.4) (E78.5)   · Intractable chronic migraine without aura and with status migrainosus (346.73) (G43.711)   · Left elbow pain (719.42) (M25.522)   · Nausea and vomiting (787.01) (R11.2)   · New daily persistent headache (339.42) (G44.52)   · New medication added (V58.69) (Z79.899)   · Obesity (BMI 30-39.9) (278.00) (E66.9)   · Rash (782.1) (R21)   · Syncope (780.2) (R55)   · Type 2 diabetes mellitus (250.00) (E11.9)   · May 2022 HgB A1C 9.5%     Past Medical History  Problems    · Hyperlipidemia (272.4) (E78.5)   · Type 2 diabetes mellitus (250.00) (E11.9)   · May 2022 HgB A1C 9.5%     Surgical History  Problems    · History of  section   · History of Cholecystectomy laparoscopic     Family History  Father    · Family history of Diabetes mellitus due to underlying condition with microalbuminuria,  without long-term current use of insulin     Social History  Problems    · No alcohol use   · No illicit drug use   · Non-smoker (V49.89) (Z78.9)     Allergies  Medication    · metformin   Diarrhea; Vomiting; Updated By: Jared Clemons; 2022 12:20:52 PM  NonMedication    · Adhesive Tape   Itching; Recorded By: Marina Cervantes; 3/31/2020 10:05:13 AM     Current Meds     Medication Name Instruction   Atorvastatin Calcium 80 MG Oral Tablet TAKE 1 TABLET DAILY.   BD Pen Needle Micro U/F 32G X 6 MM USE AS DIRECTED.   Bisoprolol-hydroCHLOROthiazide 2.5-6.25 MG Oral Tablet TAKE 1 TABLET DAILY AS DIRECTED.   Blood Pressure KIT one automatic blood pressure kit   Ezetimibe 10 MG Oral Tablet TAKE ONE TABLET BY MOUTH AT BEDTIME   FreeStyle Coni 3 Sensor Change sensor every 14 days as directed   glipiZIDE ER 2.5 MG Oral Tablet Extended Release 24 Hour TAKE 1  TABLET DAILY--with breakfast and one tab with dinner.   Glucose 4 GM Oral Tablet Chewable TAKE AS DIRECTED.   Jardiance 25 MG Oral Tablet TAKE 1 TABLET BY MOUTH ONCE DAILY   Lisinopril 10 MG Oral Tablet TAKE ONE TABLET BY MOUTH ONCE DAILY   LORazepam 2 MG Oral Tablet Take 30 minutes prior to MRI. Needs  to and from MRI.   Nurtec 75 MG Oral Tablet Disintegrating Take 1 tab qd prn for Headaches   OneTouch Delica Lancets 33G Use as directed.   OneTouch Ultra Mini w/Device Kit USE AS DIRECTED.   OneTouch Verio In Vitro Strip TEST ONCE DAILY.   Ozempic (1 MG/DOSE) 4 MG/3ML Subcutaneous Solution Pen-injector Inject 1 mg once a week as directed   SM Hydrocortisone Plus 1 % External Cream APPLY SPARINGLY TO AFFECTED AREA(S) TWICE DAILY   Ubrelvy 100 MG Oral Tablet Take one tablet on the onset of migraine           Physical Exam     Constitutional   General appearance: Alert and in no acute distress.   Eyes   Conjunctiva and lids: Normal. Pupils were equal in size, round, reactive to light (PERRL) with normal accommodation and extraocular movements intact (EOMI).   Ears, Nose, Mouth, and Throat   Hearing: Normal.     Oropharynx: Normal.    Neck   Neck: No neck mass was observed. Supple.    Thyroid: Not enlarged and there were no palpable thyroid nodules. no goiter.   Pulmonary   Respiratory effort: No respiratory distress.    Auscultation of lungs: Clear bilateral breath sounds.   Cardiovascular   Auscultation of heart: Heart rate and rhythm were normal. Normal S1 and S2, no gallops, no murmurs and no pericardial rub.    Pedal pulses: Normal.    Examination of extremities for edema and/or varicosities: No peripheral edema.    Abdomen   Abdomen: Normal bowel sounds, soft nontender; no abdominal mass palpated.    Liver and spleen: No hepatosplenomegaly.      Lymphatic   Palpation of lymph nodes in neck: No lymphadenopathy.    Palpation of lymph nodes in other areas: Normal.    Musculoskeletal   Muscle strength/tone:  Normal. No proximal muscle weakness was detected.     Gait and station: Normal.    Digits and nails: No clubbing or cyanosis of the fingernails.    Inspection/palpation of joints, bones, and muscles: No joint swelling seen, normal movements of all extremities.    Range of motion: Normal.     Stability: Normal.    Neurologic -   Cranial Nerve Exam: Normal.    Reflexes: Deep tendon reflexes were normal and without delay in the return phase.   Psychiatric   Judgment and insight: Intact.    Orientation to person, place, and time: Alert and oriented x 3.    Mood and affect: Normal.      Signatures

## 2023-10-10 ENCOUNTER — PHARMACY VISIT (OUTPATIENT)
Dept: PHARMACY | Facility: CLINIC | Age: 63
End: 2023-10-10
Payer: COMMERCIAL

## 2023-10-10 PROCEDURE — RXMED WILLOW AMBULATORY MEDICATION CHARGE

## 2023-10-17 ENCOUNTER — PHARMACY VISIT (OUTPATIENT)
Dept: PHARMACY | Facility: CLINIC | Age: 63
End: 2023-10-17
Payer: COMMERCIAL

## 2023-10-17 PROCEDURE — RXMED WILLOW AMBULATORY MEDICATION CHARGE

## 2023-10-27 ENCOUNTER — PHARMACY VISIT (OUTPATIENT)
Dept: PHARMACY | Facility: CLINIC | Age: 63
End: 2023-10-27
Payer: COMMERCIAL

## 2023-10-30 ENCOUNTER — PHARMACY VISIT (OUTPATIENT)
Dept: PHARMACY | Facility: CLINIC | Age: 63
End: 2023-10-30
Payer: COMMERCIAL

## 2023-10-30 PROCEDURE — RXMED WILLOW AMBULATORY MEDICATION CHARGE

## 2023-11-03 ENCOUNTER — PHARMACY VISIT (OUTPATIENT)
Dept: PHARMACY | Facility: CLINIC | Age: 63
End: 2023-11-03
Payer: COMMERCIAL

## 2023-11-03 PROCEDURE — RXMED WILLOW AMBULATORY MEDICATION CHARGE

## 2023-11-27 ENCOUNTER — PHARMACY VISIT (OUTPATIENT)
Dept: PHARMACY | Facility: CLINIC | Age: 63
End: 2023-11-27
Payer: COMMERCIAL

## 2023-11-27 PROCEDURE — RXMED WILLOW AMBULATORY MEDICATION CHARGE

## 2023-12-03 DIAGNOSIS — E11.42 DIABETIC POLYNEUROPATHY ASSOCIATED WITH TYPE 2 DIABETES MELLITUS (MULTI): Primary | ICD-10-CM

## 2023-12-04 ENCOUNTER — PHARMACY VISIT (OUTPATIENT)
Dept: PHARMACY | Facility: CLINIC | Age: 63
End: 2023-12-04
Payer: COMMERCIAL

## 2023-12-04 PROCEDURE — RXMED WILLOW AMBULATORY MEDICATION CHARGE

## 2023-12-04 RX ORDER — DULOXETIN HYDROCHLORIDE 60 MG/1
60 CAPSULE, DELAYED RELEASE ORAL
Qty: 30 CAPSULE | Refills: 3 | Status: SHIPPED | OUTPATIENT
Start: 2023-12-04 | End: 2024-12-03

## 2023-12-10 PROCEDURE — RXMED WILLOW AMBULATORY MEDICATION CHARGE

## 2023-12-14 ENCOUNTER — PHARMACY VISIT (OUTPATIENT)
Dept: PHARMACY | Facility: CLINIC | Age: 63
End: 2023-12-14
Payer: COMMERCIAL

## 2024-04-23 ENCOUNTER — DOCUMENTATION (OUTPATIENT)
Dept: CARE COORDINATION | Facility: CLINIC | Age: 64
End: 2024-04-23